# Patient Record
Sex: FEMALE | Race: WHITE | NOT HISPANIC OR LATINO | Employment: OTHER | URBAN - METROPOLITAN AREA
[De-identification: names, ages, dates, MRNs, and addresses within clinical notes are randomized per-mention and may not be internally consistent; named-entity substitution may affect disease eponyms.]

---

## 2017-01-24 ENCOUNTER — TELEPHONE (OUTPATIENT)
Dept: OPHTHALMOLOGY | Facility: CLINIC | Age: 52
End: 2017-01-24

## 2017-01-24 NOTE — TELEPHONE ENCOUNTER
----- Message from Kandi Dudley sent at 1/24/2017 12:39 PM CST -----  Contact: Self  Good afternoon,     Pt would like a call back regarding rescheduling appt on 01/27/17 at 1:15pm.    Pt can be reached at [phone]    Thank you!

## 2019-02-15 ENCOUNTER — TELEPHONE (OUTPATIENT)
Dept: PLASTIC SURGERY | Facility: CLINIC | Age: 54
End: 2019-02-15

## 2019-02-15 NOTE — TELEPHONE ENCOUNTER
I called this pt, I left her a detailed voicemail with our office number  . I will call her again later to see if I can get a response.         ----- Message from Naeem Spivey MD sent at 2/14/2019  5:09 PM CST -----  Bryce Abdalla-    Same thing- maybe she wants to come in sooner?    Seth

## 2019-02-15 NOTE — TELEPHONE ENCOUNTER
Called pt at Dr request to see if she could move her appt up earlier , Pt stated that she couldnt because she lives in [place] and the trip out here has already been planned ahead of time & she wouldnt be able to come any sooner.         ----- Message from Jane Cat sent at 2/15/2019  2:36 PM CST -----  Contact: Pt.Self   Patient Returning Call from [facility]    Who Left Message for Patient:   Keya     Communication Preference:  [phone]    Additional Information:    Thank You

## 2019-03-15 ENCOUNTER — TELEPHONE (OUTPATIENT)
Dept: PLASTIC SURGERY | Facility: CLINIC | Age: 54
End: 2019-03-15

## 2019-03-15 NOTE — TELEPHONE ENCOUNTER
Called pt to comfirm her appt on 3/18 at 1:00 pm. pT stated that she was just getting ready to call us to confirm appt so I called right on time. Pt stated that she knew where we are located & she would be here!

## 2019-03-18 ENCOUNTER — OFFICE VISIT (OUTPATIENT)
Dept: PLASTIC SURGERY | Facility: CLINIC | Age: 54
End: 2019-03-18
Payer: COMMERCIAL

## 2019-03-18 VITALS
HEART RATE: 89 BPM | BODY MASS INDEX: 27.54 KG/M2 | SYSTOLIC BLOOD PRESSURE: 149 MMHG | WEIGHT: 150.56 LBS | DIASTOLIC BLOOD PRESSURE: 92 MMHG

## 2019-03-18 DIAGNOSIS — T85.43XA BREAST IMPLANT RUPTURE, INITIAL ENCOUNTER: Primary | ICD-10-CM

## 2019-03-18 PROCEDURE — 99999 PR PBB SHADOW E&M-EST. PATIENT-LVL III: ICD-10-PCS | Mod: PBBFAC,,, | Performed by: SURGERY

## 2019-03-18 PROCEDURE — 99999 PR PBB SHADOW E&M-EST. PATIENT-LVL III: CPT | Mod: PBBFAC,,, | Performed by: SURGERY

## 2019-03-18 PROCEDURE — 3080F PR MOST RECENT DIASTOLIC BLOOD PRESSURE >= 90 MM HG: ICD-10-PCS | Mod: CPTII,S$GLB,, | Performed by: SURGERY

## 2019-03-18 PROCEDURE — 3008F BODY MASS INDEX DOCD: CPT | Mod: CPTII,S$GLB,, | Performed by: SURGERY

## 2019-03-18 PROCEDURE — 99204 OFFICE O/P NEW MOD 45 MIN: CPT | Mod: S$GLB,,, | Performed by: SURGERY

## 2019-03-18 PROCEDURE — 3077F SYST BP >= 140 MM HG: CPT | Mod: CPTII,S$GLB,, | Performed by: SURGERY

## 2019-03-18 PROCEDURE — 3080F DIAST BP >= 90 MM HG: CPT | Mod: CPTII,S$GLB,, | Performed by: SURGERY

## 2019-03-18 PROCEDURE — 3077F PR MOST RECENT SYSTOLIC BLOOD PRESSURE >= 140 MM HG: ICD-10-PCS | Mod: CPTII,S$GLB,, | Performed by: SURGERY

## 2019-03-18 PROCEDURE — 99204 PR OFFICE/OUTPT VISIT, NEW, LEVL IV, 45-59 MIN: ICD-10-PCS | Mod: S$GLB,,, | Performed by: SURGERY

## 2019-03-18 PROCEDURE — 3008F PR BODY MASS INDEX (BMI) DOCUMENTED: ICD-10-PCS | Mod: CPTII,S$GLB,, | Performed by: SURGERY

## 2019-03-18 RX ORDER — LABETALOL 200 MG/1
200 TABLET, FILM COATED ORAL 2 TIMES DAILY
COMMUNITY
End: 2021-01-14

## 2019-03-18 RX ORDER — ZOLPIDEM TARTRATE 10 MG/1
5 TABLET ORAL NIGHTLY PRN
COMMUNITY

## 2019-03-18 RX ORDER — PRAZOSIN HYDROCHLORIDE 1 MG/1
1 CAPSULE ORAL 2 TIMES DAILY
COMMUNITY
End: 2021-01-14

## 2019-03-18 RX ORDER — DRONABINOL 2.5 MG/1
2.5 CAPSULE ORAL
COMMUNITY

## 2019-03-19 NOTE — PROGRESS NOTES
"PLASTIC & RECONSTRUCTIVE CONSULTATION NOTE    CC  No chief complaint on file.  Wants to have bilateral breast implants removed    Referring Provider- Self  PCP: Dickson Bourne, DO    HPI  History from patient, chart, referring provider  Juany Bourne is a 53 y.o. female presenting with pain in bilateral breast after breast reconstruction.  She had bilateral nipple sparing mastectomy for "fibrocystic disease" in the past.  It is unclear whether she had cancer diagnosis.  She reports that she has had extreme sensitivity in bilateral nipple areola complexes after surgery.  She reports that she has had at least 6 different surgeries to reconstruct her breasts.  She reports that her last surgery was 1 year ago in the [place] area.  That surgeon has since .  She says that she has not seen any other providers in their area.  She explains that she had so many surgeries because she had tight scar capsules form around her saline implants.  She also had rupture of her saline implants.  She has this is the first surgery where she had silicone gel implants placed.  She has a primary care doctor in her area that is not with the [facility] system.  She has not required any kind of surveillance imaging of her breasts since having mastectomies.  She denies fevers, chills, weight loss or recent weight change.      She is also requesting to have her nipple areola complexes removed at the time of explantation because of sensitivity.      She is smoking every day but is willing to quit before surgery    [facility]  Patient Active Problem List    Diagnosis Date Noted    Post-operative state 2016    Nuclear sclerotic cataract of right eye 2016    Nuclear sclerosis 2016    Floater, vitreous 2015       [facility]  Past Surgical History:   Procedure Laterality Date    ANGIOPLASTY      cerebral    BREAST SURGERY      implants bilateral    CATARACT EXTRACTION W/  INTRAOCULAR LENS IMPLANT Left 16    Dr Omer     " HYSTERECTOMY      INSERTION-INTRAOCULAR LENS (IOL) Right 3/7/2016    [facility]    INSERTION-INTRAOCULAR LENS (IOL) Left 1/4/2016    [facility]    MASTECTOMY      bilateral    PHACOEMULSIFICATION-ASPIRATION-CATARACT Right 3/7/2016    [facility]    PHACOEMULSIFICATION-ASPIRATION-CATARACT Left 1/4/2016    [facility]    THYROIDECTOMY  2003   Brain surgery    FH  Family History   Adopted: Yes   Problem Relation Age of Onset    Hypertension Mother     Diabetes Mother     Hypertension Father     Diabetes Father     Amblyopia Neg Hx     Blindness Neg Hx     Cancer Neg Hx     Cataracts Neg Hx     Glaucoma Neg Hx     Macular degeneration Neg Hx     Retinal detachment Neg Hx     Strabismus Neg Hx     Stroke Neg Hx     Thyroid disease Neg Hx        MEDICATIONS  Outpatient Medications Marked as Taking for the 3/18/19 encounter (Office Visit) with Naeem Spivey MD   Medication Sig Dispense Refill    alprazolam (XANAX) 2 MG Tab Take 2 mg by mouth daily as needed (once daily or 1/2 tab daily).      aspirin (ECOTRIN) 81 MG EC tablet Take 81 mg by mouth once daily.      cloNIDine 0.1 mg/24 hr td ptwk (CATAPRES) 0.1 mg/24 hr Place 1 patch onto the skin every 7 days.      dronabinol (MARINOL) 2.5 MG capsule Take 2.5 mg by mouth 2 (two) times daily before meals.      insulin lispro (HUMALOG KWIKPEN) 100 unit/mL InPn pen Inject into the skin.      labetalol (NORMODYNE) 200 MG tablet Take 200 mg by mouth 2 (two) times daily.      levothyroxine (SYNTHROID) 25 MCG tablet Take 25 mcg by mouth once daily.      oxycodone (OXY-IR) 5 mg Cap Take 10 mg by mouth every 4 (four) hours as needed.      prazosin (MINIPRESS) 1 MG Cap Take 1 mg by mouth 2 (two) times daily.      PROMETHAZINE HCL (PROMETHAZINE ORAL) Take 50 mg by mouth 4 (four) times daily.      ranitidine (ZANTAC) 75 MG tablet  "Take 75 mg by mouth 2 (two) times daily.      zolpidem (AMBIEN) 10 mg Tab Take 5 mg by mouth nightly as needed.         ALLERGIES   Review of patient's allergies indicates:   Allergen Reactions    Flurox [fluorescein-benoxinate] Hives     Rash, Itching    Morphine Other (See Comments)     Mental disturbances, delusional    Tetracyclines Nausea And Vomiting    Zofran [ondansetron hcl (pf)] Other (See Comments)     Muscle spasms       SOCIAL HISTORY  Tobacco:   Social History     Tobacco Use   Smoking Status Current Some Day Smoker     EtOH:   Social History     Substance and Sexual Activity   Alcohol Use No       ROS  Pertinent ROS otherwise negative except per HPI and intake form    PHYSICAL EXAM  BP (!) 149/92   Pulse 89   Wt 68.3 kg (150 lb 9.2 oz)   BMI 27.54 kg/m²     Vitals:    03/18/19 1232   BP: (!) 149/92   Pulse: 89   Weight: 68.3 kg (150 lb 9.2 oz)       Height:   Ht Readings from Last 1 Encounters:   03/04/16 5' 2" (1.575 m)       Weight:   Wt Readings from Last 1 Encounters:   03/18/19 68.3 kg (150 lb 9.2 oz)       Body mass index is 27.54 kg/m².    Gen: Physical examination reveals a well developed, well nourished female of good mood who is alert and is oriented to person, place, time.    HEENT: Head is normocephalic and atraumatic. Extraocular motion is intact. Mucosal membranes moist.    Pulm: Breathing is non-labored, normal respiratory effort    CV: Palpable peripheral pulses    Extremities: No cyanosis or edema    Musculoskeletal: Normal gate and stance    Skin: Normal turgor    GI: Abdomen Soft, Non-tender, Non-distended. Moderate lipodystrophy.    Scars: Hypertrophic inframammary scars bilaterally.  She has roger-areolar scars bilaterally.  No axillary adenopathy.  No breast masses.  She has bilateral grade I capsules.  Her implants are at most 300 cc bilaterally.  They are small for her pockets.  She has general abdominal fat.  No hernias or masses in her abdomen.  She has dysesthesias " provoked in bilateral NAC's.  Pinch testing revealed that there is adequate laxity of tissues in the vicinity of her nacs were at least circumareolar incision.      LABS  Lab Results   Component Value Date    WBC 9.11 04/11/2006    HGB 13.6 04/11/2006    HCT 40.3 04/11/2006    MCV 92.2 04/11/2006     04/11/2006     Lab Results   Component Value Date     04/11/2006    K 3.8 04/11/2006     04/11/2006    CO2 27 04/11/2006     Lab Results   Component Value Date    ALBUMIN 5.1 02/16/2009     Lab Results   Component Value Date    CREATININE 0.8 04/11/2006     No results found for: LABA1C, HGBA1C  [unfilled]    ASSESSMENT  1.  Bilateral breast implant pain  2.  Possible history of breast cancer  3.  Cannot rule out breast implant rupture  4.  History of fibrocystic disease of breasts  5.  Travelled from out of state for surgical evaluation  6.  Actively smoking.      ?  INFORMED CONSENT FOR SURGERY  Risks, benefits, outcomes, possible complications, perioperative restrictions, recovery, and potential disability were reviewed. Permission to obtain photographs before, during, and after surgery was also granted. Questions were answered. Written preoperative instructions and potential complications were given.    PLAN    Wants bilateral breast implants removed.  Will submit for insurance authorization.     Will obtain medical photography pre-operatively  MRI ordered to rule out implant rupture.  I advised her that regardless of fee schedule for implant replacement she should proceed to evaluate whether her implants are ruptured.  She will stop smoking before surgery.  This will be a requirement for me before surgery.  I will perform nicotine testing pre-operatively.       I explained that I cannot guarantee removing the implants will get rid of her breast pain.  She accepts that she will be flat chested.  She also understands that wants to excise her nipple areolar complexes.  I would send them for  pathology.    Other risks of surgery include bleeding, infection, hematoma, seroma, chest contour abnormality, poor chest aesthetics.  I pointed out that I am concerned with making circumareolar type incisions in the setting of multiple previous mastectomy skin incisions for implant placement/revisions.  I can SPY the tissues at the time of surgery to determine perfusion.       Other risks of surgery include the possibility of gel implant extrusion into the breast parenchyma at the time of removal.  I explained that I would remove all gross implant material at the time of the surgery.  I also explained that I attempt to remove all capsule material that I can see.  I explained that some capsule could be left in her breast pocket if I deem that it is unsafe to remove it.  I explained that the capsule is often adherent to chest well and there is risk of bleeding of other complications such as pneumothorax post procedure.  I also explained that it can be adherent to the pectoralis muscle and I may decide not to remove all of the material at the time of surgery.  Other risks of the surgery include traction on the breast skin and nipple/areola, and the possibility of skin damage post procedure.  Further risks of surgery include hematoma, seroma.  She may require additional procedures post operatively and may experience asymmetries or cosmetic deformities post procedure.  She will require drains post procedure for at least 1 week.  She will need to make regular follow up appointments with me in the office.       Will need PCP, Cardiology, and PAT triage clearance before proceeding with surgery.  If she has not had labs in the last year, would need CBC and CMP and nicotine testing.       45 minutes of face to face time, of which greater than fifty percent of the total visit was  counseling/coordinating care        Plastic & Reconstructive Surgery  Microsurgery  [facility]  c/o Naeem Spivey  M.D.  [facility]  [address]  [address] [place] [address]     Work [phone]  Toll free [phone]  If no answer [phone]

## 2019-04-12 ENCOUNTER — TELEPHONE (OUTPATIENT)
Dept: PLASTIC SURGERY | Facility: CLINIC | Age: 54
End: 2019-04-12

## 2019-04-12 NOTE — PROGRESS NOTES
spoke with pt and I had MRI moved to 05/06/19 , Pt stated that after speaking w/ insurance company, they informed her that MRI would be covered after 05/01/19. Pt verbalized understanding.Stated implants were making her sick & was ready to have them out.

## 2019-04-15 ENCOUNTER — TELEPHONE (OUTPATIENT)
Dept: PLASTIC SURGERY | Facility: CLINIC | Age: 54
End: 2019-04-15

## 2019-04-15 NOTE — TELEPHONE ENCOUNTER
"Spoke with [facility] and was given the following info:  Fax letter of appeal to [phone]  - letter needs to state why mammo and US cannot be ordered prior to MRI   - needs to be sent in the next 2 weeks  - letter also needs to have case number on cover sheet and "request for reconsideration"      Peer to peer line: [phone] to set up time for phone call  - [employer]    "

## 2019-04-18 DIAGNOSIS — Z98.82 HISTORY OF BREAST IMPLANT: Primary | ICD-10-CM

## 2019-04-18 DIAGNOSIS — N64.4 PAIN IN BREAST: Primary | ICD-10-CM

## 2019-04-22 ENCOUNTER — TELEPHONE (OUTPATIENT)
Dept: PLASTIC SURGERY | Facility: CLINIC | Age: 54
End: 2019-04-22

## 2019-04-22 NOTE — TELEPHONE ENCOUNTER
called pt to get her scheduled for breast US , pt stated that she had already called and gotten scheduled for one on 4/26. She wanted to know if visit was improved by insurance comoany, I told pt I was unaware but I gave her # to billing dept she said she would call. Pt verbalized understanding.                ----- Message from Shannan Rainey PA-C sent at 4/18/2019  2:25 PM CDT -----  Breast US order placed. Please call patient and help set up apt

## 2019-05-03 ENCOUNTER — TELEPHONE (OUTPATIENT)
Dept: PLASTIC SURGERY | Facility: CLINIC | Age: 54
End: 2019-05-03

## 2019-05-03 NOTE — TELEPHONE ENCOUNTER
Spoke with pt and explained to her why MRI wasnt covered by insurance, She said that was ok as long as she could still come for the US. I told pt US was still scheduled for 5/13 but not MRI. Pt verbalized understanding.           ----- Message from Shannan Rainey PA-C sent at 5/3/2019  1:43 PM CDT -----  Contact: Pt  Simone Landaverde, are you linked to my staff inbox? If not please ask jackie to add it.      Please inform patient the reason why her insurance company would not approve MRI is due to the fact an ultrasound or mammo had not been done prior.    They will reconsider approving MRI if other info does not show conclusive info. Her insurance company will be the ones to provide her a quote on cost. Please assist her if she needs any help.    ----- Message -----  From: Uche Chanel  Sent: 5/3/2019  12:57 PM  To: Brigid Campbell Staff    Needs Advice    Reason for call:The Pt knows that she has been approved by her insurance for the US Mammo on 5/13/19.  She stated that she thought that her insurance wasn't going to pay for the MRI which still shows as an appt to follow her US.  She would like a call back from your office since you are the ordering provider.        Communication Preference:[phone]    Additional Information:She really like a call back today since it's hard to communicate with the insurance company.

## 2019-05-13 ENCOUNTER — HOSPITAL ENCOUNTER (OUTPATIENT)
Dept: RADIOLOGY | Facility: HOSPITAL | Age: 54
Discharge: HOME | End: 2019-05-13
Attending: PHYSICIAN ASSISTANT
Payer: COMMERCIAL

## 2019-05-13 VITALS — HEIGHT: 62 IN | WEIGHT: 150 LBS | BODY MASS INDEX: 27.6 KG/M2

## 2019-05-13 DIAGNOSIS — Z98.82 HISTORY OF BREAST IMPLANT: ICD-10-CM

## 2019-05-13 PROCEDURE — 76641 ULTRASOUND BREAST COMPLETE: CPT | Mod: 26,RT,, | Performed by: RADIOLOGY

## 2019-05-13 PROCEDURE — 76641 ULTRASOUND BREAST COMPLETE: CPT | Mod: TC,50,PO

## 2019-05-13 PROCEDURE — 76641 ULTRASOUND BREAST COMPLETE: CPT | Mod: 26,LT,, | Performed by: RADIOLOGY

## 2019-05-13 PROCEDURE — 76641 PR US BREAST COMPLETE UNILAT: ICD-10-PCS | Mod: 26,LT,, | Performed by: RADIOLOGY

## 2019-05-17 ENCOUNTER — TELEPHONE (OUTPATIENT)
Dept: PLASTIC SURGERY | Facility: CLINIC | Age: 54
End: 2019-05-17

## 2019-05-17 NOTE — TELEPHONE ENCOUNTER
Called and spoke with pt and let her know that her US results were negative & there were no findings of extra implant fluid collection  ,, no evidence of any suspicious mass & I would be in touch with Dr Spivey regarding a possible plan of action for pt. Pt was hysterical telling me she is in constant pain because she believed implants were leaking. I explained to the pt that I wasn't saying she way lying I was simply calling to give her the results of US . Pt verbalized understanding.

## 2019-05-20 ENCOUNTER — TELEPHONE (OUTPATIENT)
Dept: PLASTIC SURGERY | Facility: CLINIC | Age: 54
End: 2019-05-20

## 2019-05-20 PROBLEM — T85.848A PAIN FROM BREAST IMPLANT: Status: ACTIVE | Noted: 2019-05-20

## 2019-05-20 NOTE — TELEPHONE ENCOUNTER
Spoke with patient reviewed US results.    Patient would like to proceed with case submission without waiting on if MRI would be approved. She understands an appeal was sent and a peer to peer was made on her behalf for the MRI, which was still denied.    Case request sent today. Will keep patient updated. She understands this might not be covered by insurance company.

## 2019-05-31 ENCOUNTER — TELEPHONE (OUTPATIENT)
Dept: PLASTIC SURGERY | Facility: CLINIC | Age: 54
End: 2019-05-31

## 2019-05-31 NOTE — TELEPHONE ENCOUNTER
Spoke with pt regarding her insurance . Pt wanted to know if an appeal was sent. Pt stated that she is in constant pain and that she is frustrated and would like to talk to someone in insurance. I told her I would call her bk with an update. Pt verbalized understanding,               ----- Message from Analilia Shetty sent at 5/31/2019  8:46 AM CDT -----  Contact: pt  Type:  Needs Medical Advice    Who Called:  Pt  Symptoms (please be specific):  How long has patient had these symptoms:   Pharmacy name and phone #:    Would the patient rather a call back or a response via [facility]?   Best Call Back Number:[phone]  Additional Information:  Pt called would like a call back trying to schedule surgery with insurance

## 2021-01-14 ENCOUNTER — HOSPITAL ENCOUNTER (OUTPATIENT)
Dept: RADIOLOGY | Facility: HOSPITAL | Age: 56
Discharge: HOME | End: 2021-01-14
Attending: STUDENT IN AN ORGANIZED HEALTH CARE EDUCATION/TRAINING PROGRAM
Payer: COMMERCIAL

## 2021-01-14 ENCOUNTER — OFFICE VISIT (OUTPATIENT)
Dept: RHEUMATOLOGY | Facility: CLINIC | Age: 56
End: 2021-01-14
Payer: COMMERCIAL

## 2021-01-14 VITALS
DIASTOLIC BLOOD PRESSURE: 89 MMHG | WEIGHT: 151 LBS | BODY MASS INDEX: 28.51 KG/M2 | HEIGHT: 61 IN | SYSTOLIC BLOOD PRESSURE: 135 MMHG | HEART RATE: 91 BPM

## 2021-01-14 DIAGNOSIS — M19.90 OSTEOARTHRITIS, UNSPECIFIED OSTEOARTHRITIS TYPE, UNSPECIFIED SITE: ICD-10-CM

## 2021-01-14 DIAGNOSIS — R06.02 SOB (SHORTNESS OF BREATH) ON EXERTION: ICD-10-CM

## 2021-01-14 DIAGNOSIS — M25.559 HIP PAIN: ICD-10-CM

## 2021-01-14 DIAGNOSIS — R19.00 ABDOMINAL SWELLING: ICD-10-CM

## 2021-01-14 DIAGNOSIS — M79.7 FIBROMYALGIA: Primary | ICD-10-CM

## 2021-01-14 DIAGNOSIS — G47.00 INSOMNIA, UNSPECIFIED TYPE: ICD-10-CM

## 2021-01-14 PROCEDURE — 3008F BODY MASS INDEX DOCD: CPT | Mod: CPTII,S$GLB,, | Performed by: INTERNAL MEDICINE

## 2021-01-14 PROCEDURE — 1125F PR PAIN SEVERITY QUANTIFIED, PAIN PRESENT: ICD-10-PCS | Mod: S$GLB,,, | Performed by: INTERNAL MEDICINE

## 2021-01-14 PROCEDURE — 3008F PR BODY MASS INDEX (BMI) DOCUMENTED: ICD-10-PCS | Mod: CPTII,S$GLB,, | Performed by: INTERNAL MEDICINE

## 2021-01-14 PROCEDURE — 73521 X-RAY EXAM HIPS BI 2 VIEWS: CPT | Mod: 26,,, | Performed by: RADIOLOGY

## 2021-01-14 PROCEDURE — 71046 X-RAY EXAM CHEST 2 VIEWS: CPT | Mod: TC

## 2021-01-14 PROCEDURE — 3075F SYST BP GE 130 - 139MM HG: CPT | Mod: CPTII,S$GLB,, | Performed by: INTERNAL MEDICINE

## 2021-01-14 PROCEDURE — 3079F DIAST BP 80-89 MM HG: CPT | Mod: CPTII,S$GLB,, | Performed by: INTERNAL MEDICINE

## 2021-01-14 PROCEDURE — 3079F PR MOST RECENT DIASTOLIC BLOOD PRESSURE 80-89 MM HG: ICD-10-PCS | Mod: CPTII,S$GLB,, | Performed by: INTERNAL MEDICINE

## 2021-01-14 PROCEDURE — 71046 XR CHEST PA AND LATERAL: ICD-10-PCS | Mod: 26,,, | Performed by: RADIOLOGY

## 2021-01-14 PROCEDURE — 99999 PR PBB SHADOW E&M-EST. PATIENT-LVL V: CPT | Mod: PBBFAC,,, | Performed by: INTERNAL MEDICINE

## 2021-01-14 PROCEDURE — 99205 PR OFFICE/OUTPT VISIT, NEW, LEVL V, 60-74 MIN: ICD-10-PCS | Mod: S$GLB,,, | Performed by: INTERNAL MEDICINE

## 2021-01-14 PROCEDURE — 73521 X-RAY EXAM HIPS BI 2 VIEWS: CPT | Mod: TC

## 2021-01-14 PROCEDURE — 71046 X-RAY EXAM CHEST 2 VIEWS: CPT | Mod: 26,,, | Performed by: RADIOLOGY

## 2021-01-14 PROCEDURE — 3075F PR MOST RECENT SYSTOLIC BLOOD PRESS GE 130-139MM HG: ICD-10-PCS | Mod: CPTII,S$GLB,, | Performed by: INTERNAL MEDICINE

## 2021-01-14 PROCEDURE — 1125F AMNT PAIN NOTED PAIN PRSNT: CPT | Mod: S$GLB,,, | Performed by: INTERNAL MEDICINE

## 2021-01-14 PROCEDURE — 99999 PR PBB SHADOW E&M-EST. PATIENT-LVL V: ICD-10-PCS | Mod: PBBFAC,,, | Performed by: INTERNAL MEDICINE

## 2021-01-14 PROCEDURE — 99205 OFFICE O/P NEW HI 60 MIN: CPT | Mod: S$GLB,,, | Performed by: INTERNAL MEDICINE

## 2021-01-14 PROCEDURE — 73521 XR HIPS BILATERAL 2 VIEW INCL AP PELVIS: ICD-10-PCS | Mod: 26,,, | Performed by: RADIOLOGY

## 2021-01-14 RX ORDER — CLONIDINE HYDROCHLORIDE 0.1 MG/1
0.1 TABLET ORAL 2 TIMES DAILY
COMMUNITY

## 2021-01-14 RX ORDER — ATORVASTATIN CALCIUM 40 MG/1
40 TABLET, FILM COATED ORAL DAILY
COMMUNITY

## 2021-01-14 RX ORDER — THYROID 60 MG/1
90 TABLET ORAL
COMMUNITY

## 2021-01-14 RX ORDER — DULOXETIN HYDROCHLORIDE 30 MG/1
30 CAPSULE, DELAYED RELEASE ORAL DAILY
Qty: 30 CAPSULE | Refills: 11 | Status: SHIPPED | OUTPATIENT
Start: 2021-01-14 | End: 2022-01-14

## 2021-01-14 RX ORDER — PANTOPRAZOLE SODIUM 40 MG/1
40 TABLET, DELAYED RELEASE ORAL DAILY
COMMUNITY

## 2021-01-14 RX ORDER — LOSARTAN POTASSIUM 100 MG/1
100 TABLET ORAL DAILY
COMMUNITY

## 2021-01-14 ASSESSMENT — ROUTINE ASSESSMENT OF PATIENT INDEX DATA (RAPID3)
PATIENT GLOBAL ASSESSMENT SCORE: 5.5
PSYCHOLOGICAL DISTRESS SCORE: 7.7
FATIGUE SCORE: 7
AM STIFFNESS SCORE: 1, YES
MDHAQ FUNCTION SCORE: 0.5
WHEN YOU AWAKENED IN THE MORNING OVER THE LAST WEEK, PLEASE INDICATE THE AMOUNT OF TIME IT TAKES UNTIL YOU ARE AS LIMBER AS YOU WILL BE FOR THE DAY: 30
TOTAL RAPID3 SCORE: 4.39
PAIN SCORE: 6

## 2022-04-19 ENCOUNTER — TELEPHONE (OUTPATIENT)
Dept: CARDIOLOGY | Facility: CLINIC | Age: 57
End: 2022-04-19
Payer: COMMERCIAL

## 2022-04-19 NOTE — TELEPHONE ENCOUNTER
Returned patients call to set up an appointment with Dr Jung for preop clearance.  Called and left her a message since Dr Jung has not seen her in 20 years.

## 2022-06-20 ENCOUNTER — TELEPHONE (OUTPATIENT)
Dept: CARDIOLOGY | Facility: CLINIC | Age: 57
End: 2022-06-20
Payer: COMMERCIAL

## 2022-06-20 NOTE — TELEPHONE ENCOUNTER
Pt was scheduled for today @ 1100 pt was a no show, called pt to reschedule no answer left message with my direct line to call back.

## 2023-01-31 ENCOUNTER — TELEPHONE (OUTPATIENT)
Dept: CARDIOLOGY | Facility: CLINIC | Age: 58
End: 2023-01-31
Payer: COMMERCIAL

## 2023-01-31 NOTE — TELEPHONE ENCOUNTER
Patient called to reschedule her appointment with Dr Jung.  She was told she has an enlarged heart by an EKG.  They will get a copy of it and fax to Dr Jung.  When we receive it I told them we would discuss with Dr Jung and set up any other testing he may want on the same day.

## 2023-01-31 NOTE — TELEPHONE ENCOUNTER
Returned call .  Left vm with office number      ----- Message from Kamilah Gordon sent at 1/31/2023  2:52 PM CST -----  Regarding: Appt  Contact: [phone]  Patient is calling to reschedule missed appt due to COVID. Please contact pt

## 2023-01-31 NOTE — TELEPHONE ENCOUNTER
----- Message from Kamilah Gordon sent at 1/31/2023  2:52 PM CST -----  Regarding: Appt  Contact: [phone]  Patient is calling to reschedule missed appt due to COVID. Please contact pt

## 2023-02-02 DIAGNOSIS — I25.10 CORONARY ARTERY DISEASE INVOLVING NATIVE HEART, UNSPECIFIED VESSEL OR LESION TYPE, UNSPECIFIED WHETHER ANGINA PRESENT: Primary | ICD-10-CM

## 2023-03-13 ENCOUNTER — HOSPITAL ENCOUNTER (OUTPATIENT)
Dept: CARDIOLOGY | Facility: CLINIC | Age: 58
Discharge: HOME | End: 2023-03-13
Payer: COMMERCIAL

## 2023-03-13 ENCOUNTER — HOSPITAL ENCOUNTER (OUTPATIENT)
Dept: CARDIOLOGY | Facility: HOSPITAL | Age: 58
Discharge: HOME | End: 2023-03-13
Attending: INTERNAL MEDICINE
Payer: COMMERCIAL

## 2023-03-13 ENCOUNTER — OFFICE VISIT (OUTPATIENT)
Dept: CARDIOLOGY | Facility: CLINIC | Age: 58
End: 2023-03-13
Payer: COMMERCIAL

## 2023-03-13 VITALS
HEART RATE: 86 BPM | WEIGHT: 158.94 LBS | OXYGEN SATURATION: 96 % | HEIGHT: 61 IN | DIASTOLIC BLOOD PRESSURE: 82 MMHG | SYSTOLIC BLOOD PRESSURE: 127 MMHG | BODY MASS INDEX: 30.01 KG/M2

## 2023-03-13 VITALS
DIASTOLIC BLOOD PRESSURE: 80 MMHG | HEART RATE: 70 BPM | SYSTOLIC BLOOD PRESSURE: 120 MMHG | BODY MASS INDEX: 28.51 KG/M2 | WEIGHT: 151 LBS | HEIGHT: 61 IN

## 2023-03-13 DIAGNOSIS — I25.10 CORONARY ARTERY DISEASE INVOLVING NATIVE HEART, UNSPECIFIED VESSEL OR LESION TYPE, UNSPECIFIED WHETHER ANGINA PRESENT: ICD-10-CM

## 2023-03-13 DIAGNOSIS — K74.60 NON-ALCOHOLIC CIRRHOSIS: ICD-10-CM

## 2023-03-13 DIAGNOSIS — R07.9 CHEST PAIN, UNSPECIFIED TYPE: Primary | ICD-10-CM

## 2023-03-13 DIAGNOSIS — I10 PRIMARY HYPERTENSION: ICD-10-CM

## 2023-03-13 DIAGNOSIS — R07.9 CHEST PAIN ON EXERTION: ICD-10-CM

## 2023-03-13 LAB
ASCENDING AORTA: 2.92 CM
AV INDEX (PROSTH): 0.84
AV MEAN GRADIENT: 2 MMHG
AV PEAK GRADIENT: 3 MMHG
AV VALVE AREA: 2.09 CM2
AV VELOCITY RATIO: 0.88
BSA FOR ECHO PROCEDURE: 1.72 M2
CV ECHO LV RWT: 0.25 CM
DOP CALC AO PEAK VEL: 0.8 M/S
DOP CALC AO VTI: 17.1 CM
DOP CALC LVOT AREA: 2.5 CM2
DOP CALC LVOT DIAMETER: 1.78 CM
DOP CALC LVOT PEAK VEL: 0.7 M/S
DOP CALC LVOT STROKE VOLUME: 35.82 CM3
DOP CALCLVOT PEAK VEL VTI: 14.4 CM
E WAVE DECELERATION TIME: 328.16 MSEC
E/A RATIO: 0.89
E/E' RATIO: 7.64 M/S
ECHO LV POSTERIOR WALL: 0.64 CM (ref 0.6–1.1)
EJECTION FRACTION: 65 %
FRACTIONAL SHORTENING: 25 % (ref 28–44)
INTERVENTRICULAR SEPTUM: 0.52 CM (ref 0.6–1.1)
LA MAJOR: 3.65 CM
LA MINOR: 3.47 CM
LA WIDTH: 2.94 CM
LEFT ATRIUM SIZE: 3 CM
LEFT ATRIUM VOLUME INDEX MOD: 11.1 ML/M2
LEFT ATRIUM VOLUME INDEX: 15.9 ML/M2
LEFT ATRIUM VOLUME MOD: 18.59 CM3
LEFT ATRIUM VOLUME: 26.67 CM3
LEFT INTERNAL DIMENSION IN SYSTOLE: 3.87 CM (ref 2.1–4)
LEFT VENTRICLE DIASTOLIC VOLUME INDEX: 75.35 ML/M2
LEFT VENTRICLE DIASTOLIC VOLUME: 126.58 ML
LEFT VENTRICLE MASS INDEX: 57 G/M2
LEFT VENTRICLE SYSTOLIC VOLUME INDEX: 38.6 ML/M2
LEFT VENTRICLE SYSTOLIC VOLUME: 64.8 ML
LEFT VENTRICULAR INTERNAL DIMENSION IN DIASTOLE: 5.15 CM (ref 3.5–6)
LEFT VENTRICULAR MASS: 95.99 G
LV LATERAL E/E' RATIO: 7 M/S
LV SEPTAL E/E' RATIO: 8.4 M/S
MV PEAK A VEL: 0.47 M/S
MV PEAK E VEL: 0.42 M/S
MV STENOSIS PRESSURE HALF TIME: 95.17 MS
MV VALVE AREA P 1/2 METHOD: 2.31 CM2
PISA TR MAX VEL: 2.37 M/S
RA MAJOR: 3.79 CM
RA PRESSURE: 3 MMHG
RA WIDTH: 2.53 CM
RIGHT VENTRICULAR END-DIASTOLIC DIMENSION: 3.09 CM
RV TISSUE DOPPLER FREE WALL SYSTOLIC VELOCITY 1 (APICAL 4 CHAMBER VIEW): 7.41 CM/S
SINUS: 2.49 CM
STJ: 2.45 CM
TDI LATERAL: 0.06 M/S
TDI SEPTAL: 0.05 M/S
TDI: 0.06 M/S
TR MAX PG: 22 MMHG
TRICUSPID ANNULAR PLANE SYSTOLIC EXCURSION: 1.1 CM
TV REST PULMONARY ARTERY PRESSURE: 25 MMHG

## 2023-03-13 PROCEDURE — 3008F BODY MASS INDEX DOCD: CPT | Mod: CPTII,S$GLB,, | Performed by: INTERNAL MEDICINE

## 2023-03-13 PROCEDURE — 93306 TTE W/DOPPLER COMPLETE: CPT | Mod: 26,,, | Performed by: INTERNAL MEDICINE

## 2023-03-13 PROCEDURE — 3074F PR MOST RECENT SYSTOLIC BLOOD PRESSURE < 130 MM HG: ICD-10-PCS | Mod: CPTII,S$GLB,, | Performed by: INTERNAL MEDICINE

## 2023-03-13 PROCEDURE — 99999 PR PBB SHADOW E&M-EST. PATIENT-LVL IV: ICD-10-PCS | Mod: PBBFAC,,, | Performed by: INTERNAL MEDICINE

## 2023-03-13 PROCEDURE — 93005 ELECTROCARDIOGRAM TRACING: CPT | Mod: S$GLB,,, | Performed by: INTERNAL MEDICINE

## 2023-03-13 PROCEDURE — 3008F PR BODY MASS INDEX (BMI) DOCUMENTED: ICD-10-PCS | Mod: CPTII,S$GLB,, | Performed by: INTERNAL MEDICINE

## 2023-03-13 PROCEDURE — 3074F SYST BP LT 130 MM HG: CPT | Mod: CPTII,S$GLB,, | Performed by: INTERNAL MEDICINE

## 2023-03-13 PROCEDURE — 99999 PR PBB SHADOW E&M-EST. PATIENT-LVL IV: CPT | Mod: PBBFAC,,, | Performed by: INTERNAL MEDICINE

## 2023-03-13 PROCEDURE — 3079F PR MOST RECENT DIASTOLIC BLOOD PRESSURE 80-89 MM HG: ICD-10-PCS | Mod: CPTII,S$GLB,, | Performed by: INTERNAL MEDICINE

## 2023-03-13 PROCEDURE — 93306 ECHO (CUPID ONLY): ICD-10-PCS | Mod: 26,,, | Performed by: INTERNAL MEDICINE

## 2023-03-13 PROCEDURE — 99204 PR OFFICE/OUTPT VISIT, NEW, LEVL IV, 45-59 MIN: ICD-10-PCS | Mod: S$GLB,,, | Performed by: INTERNAL MEDICINE

## 2023-03-13 PROCEDURE — 1159F PR MEDICATION LIST DOCUMENTED IN MEDICAL RECORD: ICD-10-PCS | Mod: CPTII,S$GLB,, | Performed by: INTERNAL MEDICINE

## 2023-03-13 PROCEDURE — 93010 ELECTROCARDIOGRAM REPORT: CPT | Mod: S$GLB,,, | Performed by: INTERNAL MEDICINE

## 2023-03-13 PROCEDURE — 93005 EKG 12-LEAD: ICD-10-PCS | Mod: S$GLB,,, | Performed by: INTERNAL MEDICINE

## 2023-03-13 PROCEDURE — 99204 OFFICE O/P NEW MOD 45 MIN: CPT | Mod: S$GLB,,, | Performed by: INTERNAL MEDICINE

## 2023-03-13 PROCEDURE — 93306 TTE W/DOPPLER COMPLETE: CPT

## 2023-03-13 PROCEDURE — 3079F DIAST BP 80-89 MM HG: CPT | Mod: CPTII,S$GLB,, | Performed by: INTERNAL MEDICINE

## 2023-03-13 PROCEDURE — 93010 EKG 12-LEAD: ICD-10-PCS | Mod: S$GLB,,, | Performed by: INTERNAL MEDICINE

## 2023-03-13 PROCEDURE — 1159F MED LIST DOCD IN RCRD: CPT | Mod: CPTII,S$GLB,, | Performed by: INTERNAL MEDICINE

## 2023-03-13 RX ORDER — POTASSIUM CHLORIDE 750 MG/1
10 CAPSULE, EXTENDED RELEASE ORAL DAILY
COMMUNITY

## 2023-03-13 RX ORDER — ALPRAZOLAM 2 MG/1
2 TABLET ORAL NIGHTLY PRN
COMMUNITY

## 2023-03-13 RX ORDER — LEVOTHYROXINE SODIUM 75 UG/1
75 TABLET ORAL
COMMUNITY

## 2023-03-13 NOTE — ASSESSMENT & PLAN NOTE
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

## 2023-03-13 NOTE — PROGRESS NOTES
Interventional Cardiology Clinic Note    Subjective:   Patient ID:  Juany Bourne is a 57 y.o. female with diabetes, hypertension, left MCA PTA with Dr. Jung in 2001, thyroid nodules s/p thyroidectomy, non alcoholic cirrhosis who presents for abnormal ECG.     HPI  Patient comes in today because her ECG was read as having left atrial enlargement. She has intermittent chest pain at rest and with exertion, not reproducible. She has intermittent shortness of breath at rest and with exertion. She can walk 1 block before getting short of breath. Laying down helps resolve her symptoms. These have been ongoing for 1 year. Denies LE edema. She does not sleep flat due to acid reflux. She has frequent PND. Denies syncope. Denies issues with bleeding.     Review of Systems   Constitutional: Negative for chills and fever.   HENT:  Negative for nosebleeds.    Eyes:  Negative for redness.   Cardiovascular:  Positive for chest pain, dyspnea on exertion and paroxysmal nocturnal dyspnea. Negative for claudication, leg swelling, near-syncope, palpitations and syncope.   Respiratory:  Positive for shortness of breath. Negative for cough and sputum production.    Hematologic/Lymphatic: Negative for bleeding problem.   Musculoskeletal:  Negative for joint swelling and muscle weakness.   Gastrointestinal:  Negative for hematemesis, melena, nausea and vomiting.   Genitourinary:  Negative for hematuria.   Neurological:  Negative for dizziness and weakness.        History:       Past Medical History:   Diagnosis Date    Anxiety     Arthritis     Cancer     questionable breast cancer 1991    Cataract     Diabetes mellitus     insulin prn    Disc disease, degenerative, cervical     Encounter for blood transfusion     Hypertension     Stroke 2001    Tachycardia     Thyroid disease     thyroidectomy due to nodules     Past Surgical History:   Procedure Laterality Date    ANGIOPLASTY      cerebral    AUGMENTATION OF BREAST Bilateral     5  implant sx    BREAST SURGERY      implants bilateral    CATARACT EXTRACTION W/  INTRAOCULAR LENS IMPLANT Left 01/04/16    Dr Omer     HYSTERECTOMY      MASTECTOMY Bilateral     28 years ago    THYROIDECTOMY  2003     Social History     Socioeconomic History    Marital status:    Tobacco Use    Smoking status: Some Days   Substance and Sexual Activity    Alcohol use: No    Drug use: No     Family History   Adopted: Yes   Problem Relation Age of Onset    Hypertension Mother     Diabetes Mother     Hypertension Father     Diabetes Father     No Known Problems Sister     No Known Problems Brother     No Known Problems Brother     No Known Problems Maternal Aunt     No Known Problems Maternal Uncle     No Known Problems Paternal Aunt     No Known Problems Paternal Uncle     No Known Problems Maternal Grandmother     No Known Problems Maternal Grandfather     No Known Problems Paternal Grandmother     No Known Problems Paternal Grandfather     No Known Problems Other     Amblyopia Neg Hx     Blindness Neg Hx     Cancer Neg Hx     Cataracts Neg Hx     Glaucoma Neg Hx     Macular degeneration Neg Hx     Retinal detachment Neg Hx     Strabismus Neg Hx     Stroke Neg Hx     Thyroid disease Neg Hx     Anemia Neg Hx     Arrhythmia Neg Hx     Asthma Neg Hx     Clotting disorder Neg Hx     Fainting Neg Hx     Heart attack Neg Hx     Heart disease Neg Hx     Heart failure Neg Hx     Hyperlipidemia Neg Hx     Atrial Septal Defect Neg Hx       Review of patient's allergies indicates:   Allergen Reactions    Flurox [fluorescein-benoxinate] Hives     Rash, Itching    Lyrica [pregabalin] Hallucinations    Morphine Other (See Comments)     Mental disturbances, delusional    Tetracyclines Nausea And Vomiting    Zofran [ondansetron hcl (pf)] Other (See Comments)     Muscle spasms    Gabapentin Nausea And Vomiting     has a current medication list which includes the following prescription(s): alprazolam, clonidine, clonidine 0.1  mg/24 hr td ptwk, ergocalciferol (vitamin d2), levothyroxine, oxycodone, pantoprazole, potassium chloride, promethazine hcl, thyroid (pork), aspirin, atorvastatin, dronabinol, duloxetine, insulin lispro, losartan, propranolol, and zolpidem.           Meds:     Review of patient's allergies indicates:   Allergen Reactions    Flurox [fluorescein-benoxinate] Hives     Rash, Itching    Lyrica [pregabalin] Hallucinations    Morphine Other (See Comments)     Mental disturbances, delusional    Tetracyclines Nausea And Vomiting    Zofran [ondansetron hcl (pf)] Other (See Comments)     Muscle spasms    Gabapentin Nausea And Vomiting       Current Outpatient Medications:     ALPRAZolam (XANAX) 2 MG Tab, Take 2 mg by mouth nightly as needed., Disp: , Rfl:     cloNIDine (CATAPRES) 0.1 MG tablet, Take 0.1 mg by mouth 2 (two) times daily., Disp: , Rfl:     cloNIDine 0.1 mg/24 hr td ptwk (CATAPRES) 0.1 mg/24 hr, Place 1 patch onto the skin every 7 days., Disp: , Rfl:     ergocalciferol, vitamin D2, (VITAMIN D ORAL), Take 1 tablet by mouth Daily., Disp: , Rfl:     levothyroxine (SYNTHROID) 75 MCG tablet, Take 75 mcg by mouth before breakfast., Disp: , Rfl:     oxycodone (OXY-IR) 5 mg Cap, Take 10 mg by mouth every 4 (four) hours as needed., Disp: , Rfl:     pantoprazole (PROTONIX) 40 MG tablet, Take 40 mg by mouth once daily., Disp: , Rfl:     potassium chloride (MICRO-K) 10 MEQ CpSR, Take 10 mEq by mouth once daily., Disp: , Rfl:     PROMETHAZINE HCL (PROMETHAZINE ORAL), Take 25 mg by mouth every 6 (six) hours as needed. 1-2 tablet, Disp: , Rfl:     thyroid, pork, (ARMOUR THYROID) 60 mg Tab, Take 90 mg by mouth before breakfast., Disp: , Rfl:     aspirin (ECOTRIN) 81 MG EC tablet, Take 325 mg by mouth once daily. , Disp: , Rfl:     atorvastatin (LIPITOR) 40 MG tablet, Take 40 mg by mouth once daily., Disp: , Rfl:     dronabinol (MARINOL) 2.5 MG capsule, Take 2.5 mg by mouth 2 (two) times daily before meals., Disp: , Rfl:      "DULoxetine (CYMBALTA) 30 MG capsule, Take 1 capsule (30 mg total) by mouth once daily. (Patient not taking: Reported on 3/13/2023), Disp: 30 capsule, Rfl: 11    insulin lispro 100 unit/mL pen, Inject into the skin., Disp: , Rfl:     losartan (COZAAR) 100 MG tablet, Take 100 mg by mouth once daily., Disp: , Rfl:     propranoloL 120 mg Cp24, Take by mouth., Disp: , Rfl:     zolpidem (AMBIEN) 10 mg Tab, Take 5 mg by mouth nightly as needed., Disp: , Rfl:     Objective:   /82 (BP Location: Left arm, Patient Position: Sitting, BP Method: Large (Automatic))   Pulse 86   Ht 5' 1.2" (1.554 m)   Wt 72.1 kg (158 lb 15.2 oz)   SpO2 96%   BMI 29.84 kg/m²   Physical Exam  Constitutional:       Appearance: Normal appearance.   HENT:      Head: Normocephalic and atraumatic.      Mouth/Throat:      Mouth: Mucous membranes are moist.   Eyes:      Extraocular Movements: Extraocular movements intact.   Cardiovascular:      Rate and Rhythm: Normal rate and regular rhythm.      Pulses:           Radial pulses are 2+ on the right side and 2+ on the left side.      Heart sounds: Normal heart sounds, S1 normal and S2 normal. No murmur heard.  Pulmonary:      Effort: Pulmonary effort is normal.      Breath sounds: Normal breath sounds. No wheezing, rhonchi or rales.   Abdominal:      General: Bowel sounds are normal. There is no distension.      Palpations: Abdomen is soft.      Tenderness: There is no abdominal tenderness.   Musculoskeletal:      Cervical back: Normal range of motion.      Right lower leg: No edema.      Left lower leg: No edema.   Skin:     General: Skin is warm and dry.   Neurological:      Mental Status: She is alert and oriented to person, place, and time.   Psychiatric:         Mood and Affect: Mood normal.         Behavior: Behavior normal.       Labs:     Lab Results   Component Value Date     04/11/2006    K 3.8 04/11/2006     04/11/2006    CO2 27 04/11/2006    BUN 7 04/11/2006    CREATININE " 0.8 2006     No results found for: HGBA1C  No results found for: BNP, BNPTRIAGEBLO    Lab Results   Component Value Date    WBC 9.11 2006    HGB 13.6 2006    HCT 40.3 2006     2006     Lab Results   Component Value Date    CHOL 260 (H) 2009    HDL 46 2009    LDLCALC 188.0 (H) 2009    TRIG 130 2009       Lab Results   Component Value Date     2006    K 3.8 2006     2006    CO2 27 2006    BUN 7 2006    CREATININE 0.8 2006     No results found for: HGBA1C  No results found for: BNP, BNPTRIAGEBLO Lab Results   Component Value Date    WBC 9.11 2006    HGB 13.6 2006    HCT 40.3 2006     2006     Lab Results   Component Value Date    CHOL 260 (H) 2009    HDL 46 2009    LDLCALC 188.0 (H) 2009    TRIG 130 2009                Cardiovascular Imaging:     [facility]:    - normal coronary arteries (in legacy)    Assessment:       1. Chest pain, unspecified type    2. Chest pain on exertion    3. Non-alcoholic cirrhosis    4. Primary hypertension             Plan:     Chest pain on exertion  Patient have atypical chest pain at rest and on exertion, is not reproducible.   Plan for pharm stress echo, will call patient with results    Non-alcoholic cirrhosis  Patient has had non alcoholic cirrhosis for a year  Has not been followed recently, has plans to arrange for follow up    Primary hypertension  Blood pressure well controlled  Continue current anti hypertensives        Signed:  Lexx Boyd M.D.  Interventional Cardiology Fellow PGY-8  [facility]   2023    Staff:  I have personally taken the history and examined this patient and agree with the fellow's note as stated above and amended it accordingly :-)  She  has LVH with strain on EKG and atypical chest pain.  Will do  stress echo to r/o ischemia.

## 2023-03-13 NOTE — ASSESSMENT & PLAN NOTE
Patient have atypical chest pain at rest and on exertion, is not reproducible.   Plan for pharm stress echo, will call patient with results

## 2023-03-23 ENCOUNTER — TELEPHONE (OUTPATIENT)
Dept: HEPATOLOGY | Facility: CLINIC | Age: 58
End: 2023-03-23
Payer: COMMERCIAL

## 2023-03-23 NOTE — TELEPHONE ENCOUNTER
----- Message from Reji Dawkins MA sent at 3/23/2023  9:37 AM CDT -----  Regarding: Schedule appt  Contact: [phone]  Patient is calling to find out if her appt with doctor can be on the same day as Stress test. Patient is coming from [place]. Please call and advise.

## 2023-03-31 ENCOUNTER — TELEPHONE (OUTPATIENT)
Dept: HEPATOLOGY | Facility: CLINIC | Age: 58
End: 2023-03-31
Payer: COMMERCIAL

## 2023-03-31 ENCOUNTER — TELEPHONE (OUTPATIENT)
Dept: TRANSPLANT | Facility: CLINIC | Age: 58
End: 2023-03-31
Payer: COMMERCIAL

## 2023-03-31 ENCOUNTER — PATIENT MESSAGE (OUTPATIENT)
Dept: HEPATOLOGY | Facility: CLINIC | Age: 58
End: 2023-03-31
Payer: COMMERCIAL

## 2023-03-31 NOTE — TELEPHONE ENCOUNTER
----- Message from Lana Sanford sent at 3/30/2023  3:24 PM CDT -----  Regarding: FW: Ext referral    ----- Message -----  From: Yarelis Doe  Sent: 3/30/2023   3:21 PM CDT  To: [facility] Liver Referral Pool  Subject: Ext referral                                     Good afternoon,    Current pt is being referred from Dr Dickson Fabian for fatty liver. I have scanned the referral and records in to media mgr. Please contact pt to schedule and let me know if I can help any further.    Thank you,  Yarelis Doe  [facility]  Ext [phone]

## 2023-03-31 NOTE — TELEPHONE ENCOUNTER
----- Message from Lana Sanford sent at 3/31/2023  3:12 PM CDT -----  Regarding: sending bec i see you spoke with her re appts same day.  riya had the referral sent to me yesterday. Referral entered.  insurance up to date  Pt records reviewed.  Pt will be referred to Hepatology due to fatty liver  Initial referral received  from  Darion Fabian  [phone] Fax [phone]  Referral letter sent to patient.      RECORDS SCANNED IN MEDIA UNDER HEPATOLOGY REFERRAL .

## 2023-03-31 NOTE — TELEPHONE ENCOUNTER
Pt records reviewed.  Pt will be referred to Hepatology due to fatty liver  Initial referral received  from  Darion Fabian  [phone] Fax [phone]  Referral letter sent to patient.      RECORDS SCANNED IN MEDIA UNDER HEPATOLOGY REFERRAL .

## 2023-04-03 ENCOUNTER — TELEPHONE (OUTPATIENT)
Dept: HEPATOLOGY | Facility: CLINIC | Age: 58
End: 2023-04-03
Payer: COMMERCIAL

## 2023-04-03 NOTE — TELEPHONE ENCOUNTER
----- Message from Lana Sanford sent at 3/31/2023  3:12 PM CDT -----  Regarding: sending bec i see you spoke with her re appts same day.  riya had the referral sent to me yesterday. Referral entered.  insurance up to date  Pt records reviewed.  Pt will be referred to Hepatology due to fatty liver  Initial referral received  from  Darion Fabian  [phone] Fax [phone]  Referral letter sent to patient.      RECORDS SCANNED IN MEDIA UNDER HEPATOLOGY REFERRAL .

## 2023-04-03 NOTE — TELEPHONE ENCOUNTER
Spoke w pt she stated she wants to see a MD will place a recall the next available in person is August 2023

## 2023-04-03 NOTE — TELEPHONE ENCOUNTER
----- Message from Lana Sanford sent at 3/31/2023  3:12 PM CDT -----  Regarding: sending bec i see you spoke with her re appts same day.  riya had the referral sent to me yesterday. Referral entered.  insurance up to date  Pt records reviewed.  Pt will be referred to Hepatology due to fatty liver  Initial referral received  from  Darion Fabian  [phone] Fax [phone]  Referral letter sent to patient.      RECORDS SCANNED IN MEDIA UNDER HEPATOLOGY REFERRAL .

## 2023-07-26 ENCOUNTER — TELEPHONE (OUTPATIENT)
Dept: HEPATOLOGY | Facility: CLINIC | Age: 58
End: 2023-07-26
Payer: COMMERCIAL

## 2023-07-26 NOTE — TELEPHONE ENCOUNTER
----- Message from Jonathan Bland sent at 7/26/2023 10:44 AM CDT -----  Contact: [phone]  Juany Bourne calling regarding Appointment Access  (message) Pt would like to schedule a appt to see Dr Martinez only try to schedule no appt available appt asking for a call back to help get schedule.

## 2023-08-02 NOTE — TELEPHONE ENCOUNTER
Patient called to schedule Hepatology appointment, left voicemail to call back at [phone]. Patient wants to be seen by Dr. Martinez lives in [place]. Patient cancelled appointment in July

## 2023-08-04 ENCOUNTER — TELEPHONE (OUTPATIENT)
Dept: HEPATOLOGY | Facility: CLINIC | Age: 58
End: 2023-08-04
Payer: COMMERCIAL

## 2023-08-04 NOTE — TELEPHONE ENCOUNTER
Called again the pt, to offer the 8/9 appt.  Patient declined the offer.  But, she ask to schedule here to the next available 10/19/2023 with Dr. Martinez.  Scheduled appointment.  patient confirmed and agreed with the schedule.  Reminder letter mailed.

## 2023-08-04 NOTE — TELEPHONE ENCOUNTER
----- Message from Julius Navarro MA sent at 8/4/2023 11:12 AM CDT -----  Regarding: FW: Missed call  Contact: Pt [phone]    ----- Message -----  From: Gabrielle Dominguez RN  Sent: 8/4/2023  11:05 AM CDT  To: Julius Navarro MA  Subject: RE: Missed call                                  Called patient and left her voicemail, Dr. Martinez has an opening on Wed 8/9/23 in the afternoon.    Thanks  ----- Message -----  From: Julius Navarro MA  Sent: 8/2/2023   4:06 PM CDT  To: Gabrielle Dominguez RN  Subject: FW: Missed call                                    ----- Message -----  From: Cherrie Clemons  Sent: 8/2/2023   3:44 PM CDT  To: Michelle Jenkins Staff  Subject: Missed call                                              Caller:  Juany Bourne       Returning call to:  Gabrielle       Caller can be reached at:  [phone]      Nature of the call:   Returning missed call in regard to scheduling appt

## 2023-08-04 NOTE — TELEPHONE ENCOUNTER
Scheduling attempt # 2:  Called the patient to schedule a hepatology consult from referral.  No answer, left voicemail with call back # [phone].

## 2023-08-04 NOTE — TELEPHONE ENCOUNTER
Returned call to the patient to schedule a hepatology consult from referral. Offered 8/9/2023, patient stated that schedule is not going to work, they have an schedule for that day. She just ask to put in the next available which is Oct. 19, 2023 @ 9:30 am.  Patient confirmed and agreed with the schedule.  Reminder letter mailed.

## 2023-10-19 ENCOUNTER — PROCEDURE VISIT (OUTPATIENT)
Dept: HEPATOLOGY | Facility: CLINIC | Age: 58
End: 2023-10-19
Payer: COMMERCIAL

## 2023-10-19 ENCOUNTER — LAB VISIT (OUTPATIENT)
Dept: LAB | Facility: HOSPITAL | Age: 58
End: 2023-10-19
Attending: INTERNAL MEDICINE
Payer: COMMERCIAL

## 2023-10-19 ENCOUNTER — OFFICE VISIT (OUTPATIENT)
Dept: HEPATOLOGY | Facility: CLINIC | Age: 58
End: 2023-10-19
Payer: COMMERCIAL

## 2023-10-19 ENCOUNTER — TELEPHONE (OUTPATIENT)
Dept: HEPATOLOGY | Facility: CLINIC | Age: 58
End: 2023-10-19
Payer: COMMERCIAL

## 2023-10-19 VITALS
WEIGHT: 160.25 LBS | HEART RATE: 106 BPM | OXYGEN SATURATION: 95 % | BODY MASS INDEX: 30.09 KG/M2 | DIASTOLIC BLOOD PRESSURE: 86 MMHG | SYSTOLIC BLOOD PRESSURE: 143 MMHG

## 2023-10-19 DIAGNOSIS — K76.0 NAFLD (NONALCOHOLIC FATTY LIVER DISEASE): ICD-10-CM

## 2023-10-19 DIAGNOSIS — K76.0 NAFLD (NONALCOHOLIC FATTY LIVER DISEASE): Primary | ICD-10-CM

## 2023-10-19 LAB
AFP SERPL-MCNC: 4.3 NG/ML (ref 0–8.4)
ALBUMIN SERPL BCP-MCNC: 4 G/DL (ref 3.5–5.2)
ALP SERPL-CCNC: 82 U/L (ref 55–135)
ALT SERPL W/O P-5'-P-CCNC: 34 U/L (ref 10–44)
ANION GAP SERPL CALC-SCNC: 14 MMOL/L (ref 8–16)
AST SERPL-CCNC: 31 U/L (ref 10–40)
BASOPHILS # BLD AUTO: 0.03 K/UL (ref 0–0.2)
BASOPHILS # BLD AUTO: 0.03 K/UL (ref 0–0.2)
BASOPHILS NFR BLD: 0.3 % (ref 0–1.9)
BASOPHILS NFR BLD: 0.3 % (ref 0–1.9)
BILIRUB SERPL-MCNC: 0.3 MG/DL (ref 0.1–1)
BUN SERPL-MCNC: 12 MG/DL (ref 6–20)
CALCIUM SERPL-MCNC: 9.8 MG/DL (ref 8.7–10.5)
CHLORIDE SERPL-SCNC: 104 MMOL/L (ref 95–110)
CO2 SERPL-SCNC: 23 MMOL/L (ref 23–29)
CREAT SERPL-MCNC: 1 MG/DL (ref 0.5–1.4)
DIFFERENTIAL METHOD: ABNORMAL
DIFFERENTIAL METHOD: ABNORMAL
EOSINOPHIL # BLD AUTO: 0 K/UL (ref 0–0.5)
EOSINOPHIL # BLD AUTO: 0 K/UL (ref 0–0.5)
EOSINOPHIL NFR BLD: 0.4 % (ref 0–8)
EOSINOPHIL NFR BLD: 0.4 % (ref 0–8)
ERYTHROCYTE [DISTWIDTH] IN BLOOD BY AUTOMATED COUNT: 14.2 % (ref 11.5–14.5)
ERYTHROCYTE [DISTWIDTH] IN BLOOD BY AUTOMATED COUNT: 14.2 % (ref 11.5–14.5)
EST. GFR  (NO RACE VARIABLE): >60 ML/MIN/1.73 M^2
FERRITIN SERPL-MCNC: 315 NG/ML (ref 20–300)
GLUCOSE SERPL-MCNC: 118 MG/DL (ref 70–110)
HBV SURFACE AG SERPL QL IA: NORMAL
HCT VFR BLD AUTO: 46.2 % (ref 37–48.5)
HCT VFR BLD AUTO: 46.2 % (ref 37–48.5)
HCV AB SERPL QL IA: NORMAL
HGB BLD-MCNC: 15.1 G/DL (ref 12–16)
HGB BLD-MCNC: 15.1 G/DL (ref 12–16)
IGA SERPL-MCNC: 125 MG/DL (ref 40–350)
IGG SERPL-MCNC: 1390 MG/DL (ref 650–1600)
IGM SERPL-MCNC: 130 MG/DL (ref 50–300)
IMM GRANULOCYTES # BLD AUTO: 0.03 K/UL (ref 0–0.04)
IMM GRANULOCYTES # BLD AUTO: 0.03 K/UL (ref 0–0.04)
IMM GRANULOCYTES NFR BLD AUTO: 0.3 % (ref 0–0.5)
IMM GRANULOCYTES NFR BLD AUTO: 0.3 % (ref 0–0.5)
INR PPP: 0.9 (ref 0.8–1.2)
LYMPHOCYTES # BLD AUTO: 1.5 K/UL (ref 1–4.8)
LYMPHOCYTES # BLD AUTO: 1.5 K/UL (ref 1–4.8)
LYMPHOCYTES NFR BLD: 15.4 % (ref 18–48)
LYMPHOCYTES NFR BLD: 15.4 % (ref 18–48)
MCH RBC QN AUTO: 29.8 PG (ref 27–31)
MCH RBC QN AUTO: 29.8 PG (ref 27–31)
MCHC RBC AUTO-ENTMCNC: 32.7 G/DL (ref 32–36)
MCHC RBC AUTO-ENTMCNC: 32.7 G/DL (ref 32–36)
MCV RBC AUTO: 91 FL (ref 82–98)
MCV RBC AUTO: 91 FL (ref 82–98)
MONOCYTES # BLD AUTO: 0.4 K/UL (ref 0.3–1)
MONOCYTES # BLD AUTO: 0.4 K/UL (ref 0.3–1)
MONOCYTES NFR BLD: 4.3 % (ref 4–15)
MONOCYTES NFR BLD: 4.3 % (ref 4–15)
NEUTROPHILS # BLD AUTO: 7.6 K/UL (ref 1.8–7.7)
NEUTROPHILS # BLD AUTO: 7.6 K/UL (ref 1.8–7.7)
NEUTROPHILS NFR BLD: 79.3 % (ref 38–73)
NEUTROPHILS NFR BLD: 79.3 % (ref 38–73)
NRBC BLD-RTO: 0 /100 WBC
NRBC BLD-RTO: 0 /100 WBC
PLATELET # BLD AUTO: 346 K/UL (ref 150–450)
PLATELET # BLD AUTO: 346 K/UL (ref 150–450)
PMV BLD AUTO: 8.8 FL (ref 9.2–12.9)
PMV BLD AUTO: 8.8 FL (ref 9.2–12.9)
POTASSIUM SERPL-SCNC: 4.1 MMOL/L (ref 3.5–5.1)
PROT SERPL-MCNC: 8.3 G/DL (ref 6–8.4)
PROTHROMBIN TIME: 9.7 SEC (ref 9–12.5)
RBC # BLD AUTO: 5.06 M/UL (ref 4–5.4)
RBC # BLD AUTO: 5.06 M/UL (ref 4–5.4)
SODIUM SERPL-SCNC: 141 MMOL/L (ref 136–145)
WBC # BLD AUTO: 9.53 K/UL (ref 3.9–12.7)
WBC # BLD AUTO: 9.53 K/UL (ref 3.9–12.7)

## 2023-10-19 PROCEDURE — 87340 HEPATITIS B SURFACE AG IA: CPT | Performed by: INTERNAL MEDICINE

## 2023-10-19 PROCEDURE — 82542 COL CHROMOTOGRAPHY QUAL/QUAN: CPT | Performed by: INTERNAL MEDICINE

## 2023-10-19 PROCEDURE — 91200 FIBROSCAN NEW ORLEANS (VIBRATION CONTROLLED TRANSIENT ELASTOGRAPHY): ICD-10-PCS | Mod: S$GLB,,, | Performed by: INTERNAL MEDICINE

## 2023-10-19 PROCEDURE — 3077F SYST BP >= 140 MM HG: CPT | Mod: CPTII,S$GLB,, | Performed by: INTERNAL MEDICINE

## 2023-10-19 PROCEDURE — 86235 NUCLEAR ANTIGEN ANTIBODY: CPT | Mod: 59 | Performed by: INTERNAL MEDICINE

## 2023-10-19 PROCEDURE — 99205 PR OFFICE/OUTPT VISIT, NEW, LEVL V, 60-74 MIN: ICD-10-PCS | Mod: S$GLB,,, | Performed by: INTERNAL MEDICINE

## 2023-10-19 PROCEDURE — 3008F BODY MASS INDEX DOCD: CPT | Mod: CPTII,S$GLB,, | Performed by: INTERNAL MEDICINE

## 2023-10-19 PROCEDURE — 86038 ANTINUCLEAR ANTIBODIES: CPT | Performed by: INTERNAL MEDICINE

## 2023-10-19 PROCEDURE — 3008F PR BODY MASS INDEX (BMI) DOCUMENTED: ICD-10-PCS | Mod: CPTII,S$GLB,, | Performed by: INTERNAL MEDICINE

## 2023-10-19 PROCEDURE — 80053 COMPREHEN METABOLIC PANEL: CPT | Performed by: INTERNAL MEDICINE

## 2023-10-19 PROCEDURE — 86039 ANTINUCLEAR ANTIBODIES (ANA): CPT | Performed by: INTERNAL MEDICINE

## 2023-10-19 PROCEDURE — 85025 COMPLETE CBC W/AUTO DIFF WBC: CPT | Performed by: INTERNAL MEDICINE

## 2023-10-19 PROCEDURE — 99999 PR PBB SHADOW E&M-EST. PATIENT-LVL IV: CPT | Mod: PBBFAC,,, | Performed by: INTERNAL MEDICINE

## 2023-10-19 PROCEDURE — 82105 ALPHA-FETOPROTEIN SERUM: CPT | Performed by: INTERNAL MEDICINE

## 2023-10-19 PROCEDURE — 82784 ASSAY IGA/IGD/IGG/IGM EACH: CPT | Mod: 59 | Performed by: INTERNAL MEDICINE

## 2023-10-19 PROCEDURE — 3079F DIAST BP 80-89 MM HG: CPT | Mod: CPTII,S$GLB,, | Performed by: INTERNAL MEDICINE

## 2023-10-19 PROCEDURE — 91200 LIVER ELASTOGRAPHY: CPT | Mod: S$GLB,,, | Performed by: INTERNAL MEDICINE

## 2023-10-19 PROCEDURE — 1159F MED LIST DOCD IN RCRD: CPT | Mod: CPTII,S$GLB,, | Performed by: INTERNAL MEDICINE

## 2023-10-19 PROCEDURE — 99999 PR PBB SHADOW E&M-EST. PATIENT-LVL IV: ICD-10-PCS | Mod: PBBFAC,,, | Performed by: INTERNAL MEDICINE

## 2023-10-19 PROCEDURE — 99205 OFFICE O/P NEW HI 60 MIN: CPT | Mod: S$GLB,,, | Performed by: INTERNAL MEDICINE

## 2023-10-19 PROCEDURE — 82728 ASSAY OF FERRITIN: CPT | Performed by: INTERNAL MEDICINE

## 2023-10-19 PROCEDURE — 3077F PR MOST RECENT SYSTOLIC BLOOD PRESSURE >= 140 MM HG: ICD-10-PCS | Mod: CPTII,S$GLB,, | Performed by: INTERNAL MEDICINE

## 2023-10-19 PROCEDURE — 85610 PROTHROMBIN TIME: CPT | Performed by: INTERNAL MEDICINE

## 2023-10-19 PROCEDURE — 86015 ACTIN ANTIBODY EACH: CPT | Performed by: INTERNAL MEDICINE

## 2023-10-19 PROCEDURE — 86803 HEPATITIS C AB TEST: CPT | Performed by: INTERNAL MEDICINE

## 2023-10-19 PROCEDURE — 3079F PR MOST RECENT DIASTOLIC BLOOD PRESSURE 80-89 MM HG: ICD-10-PCS | Mod: CPTII,S$GLB,, | Performed by: INTERNAL MEDICINE

## 2023-10-19 PROCEDURE — 1159F PR MEDICATION LIST DOCUMENTED IN MEDICAL RECORD: ICD-10-PCS | Mod: CPTII,S$GLB,, | Performed by: INTERNAL MEDICINE

## 2023-10-19 PROCEDURE — 86381 MITOCHONDRIAL ANTIBODY EACH: CPT | Performed by: INTERNAL MEDICINE

## 2023-10-19 NOTE — PROGRESS NOTES
P  Subjective:       Patient ID: Juany Bourne is a 57 y.o. female.    Chief Complaint: Fatty Liver    HPI  I saw this 57 y.o.lady who came to the liver clinic with her .    Hepatic steatosis with midly elevated LFTs  NAFLD 5 years ago    Distended abdo- no ascites- for multiple years  Occ edema    Daily vomiting until started on meds  Also diagnosed with Acute intermittent porphyria- [facility]    - recent coronary angio- ?normal accroding to patient      PMH:  DM  Hypertension  CAD  Thyroidectomy  Bilateral mastecomies or fibrocystic disease  Middle cerebral artery PTA in 2001  Hysterectomy    SH:  No alcohol  Vapes    FH:  Doesn't know FH- raised by state      Review of Systems   Constitutional:  Negative for activity change, appetite change, chills, fatigue, fever and unexpected weight change.   HENT:  Negative for ear pain, hearing loss, nosebleeds, sore throat and trouble swallowing.    Eyes:  Negative for redness and visual disturbance.   Respiratory:  Negative for cough, chest tightness, shortness of breath and wheezing.    Cardiovascular:  Negative for chest pain and palpitations.   Gastrointestinal:  Negative for abdominal distention, abdominal pain, blood in stool, constipation, diarrhea, nausea and vomiting.   Genitourinary:  Negative for difficulty urinating, dysuria, frequency, hematuria and urgency.   Musculoskeletal:  Negative for arthralgias, back pain, gait problem, joint swelling and myalgias.   Skin:  Negative for rash.   Neurological:  Negative for tremors, seizures, speech difficulty, weakness and headaches.   Hematological:  Negative for adenopathy.   Psychiatric/Behavioral:  Negative for confusion, decreased concentration and sleep disturbance. The patient is not nervous/anxious.          Lab Results   Component Value Date    ALT 34 10/19/2023    AST 31 10/19/2023    ALKPHOS 82 10/19/2023    BILITOT 0.3 10/19/2023     Past Medical History:   Diagnosis Date    Anxiety     Arthritis      Cancer     questionable breast cancer 1991    Cataract     Diabetes mellitus     insulin prn    Disc disease, degenerative, cervical     Encounter for blood transfusion     Hypertension     Stroke 2001    Tachycardia     Thyroid disease     thyroidectomy due to nodules     Past Surgical History:   Procedure Laterality Date    ANGIOPLASTY      cerebral    AUGMENTATION OF BREAST Bilateral     5 implant sx    BREAST SURGERY      implants bilateral    CATARACT EXTRACTION W/  INTRAOCULAR LENS IMPLANT Left 01/04/16    Dr Omer     HYSTERECTOMY      MASTECTOMY Bilateral     28 years ago    THYROIDECTOMY  2003     Current Outpatient Medications   Medication Sig    ALPRAZolam (XANAX) 2 MG Tab Take 2 mg by mouth nightly as needed.    aspirin (ECOTRIN) 81 MG EC tablet Take 325 mg by mouth once daily.     atorvastatin (LIPITOR) 40 MG tablet Take 40 mg by mouth once daily.    cloNIDine (CATAPRES) 0.1 MG tablet Take 0.1 mg by mouth 2 (two) times daily.    cloNIDine 0.1 mg/24 hr td ptwk (CATAPRES) 0.1 mg/24 hr Place 1 patch onto the skin every 7 days.    dronabinol (MARINOL) 2.5 MG capsule Take 2.5 mg by mouth 2 (two) times daily before meals.    ergocalciferol, vitamin D2, (VITAMIN D ORAL) Take 1 tablet by mouth Daily.    insulin lispro 100 unit/mL pen Inject into the skin.    levothyroxine (SYNTHROID) 75 MCG tablet Take 75 mcg by mouth before breakfast.    losartan (COZAAR) 100 MG tablet Take 100 mg by mouth once daily.    oxycodone (OXY-IR) 5 mg Cap Take 10 mg by mouth every 4 (four) hours as needed.    pantoprazole (PROTONIX) 40 MG tablet Take 40 mg by mouth once daily.    potassium chloride (MICRO-K) 10 MEQ CpSR Take 10 mEq by mouth once daily.    PROMETHAZINE HCL (PROMETHAZINE ORAL) Take 25 mg by mouth every 6 (six) hours as needed. 1-2 tablet    propranoloL 120 mg Cp24 Take by mouth.    thyroid, pork, (ARMOUR THYROID) 60 mg Tab Take 90 mg by mouth before breakfast.    zolpidem (AMBIEN) 10 mg Tab Take 5 mg by mouth nightly as  needed.    DULoxetine (CYMBALTA) 30 MG capsule Take 1 capsule (30 mg total) by mouth once daily. (Patient not taking: Reported on 3/13/2023)     No current facility-administered medications for this visit.       Objective:      Physical Exam  Constitutional:       General: She is not in acute distress.  HENT:      Head: Normocephalic.   Eyes:      Pupils: Pupils are equal, round, and reactive to light.   Neck:      Thyroid: No thyromegaly.      Vascular: No JVD.      Trachea: No tracheal deviation.   Cardiovascular:      Rate and Rhythm: Normal rate and regular rhythm.      Heart sounds: Normal heart sounds. No murmur heard.  Pulmonary:      Effort: Pulmonary effort is normal.      Breath sounds: Normal breath sounds. No stridor.   Abdominal:      General: There is distension.      Palpations: Abdomen is soft.      Comments: No signs of obvious ascites   Musculoskeletal:      Right lower leg: No edema.      Left lower leg: No edema.   Lymphadenopathy:      Head:      Right side of head: No submental, submandibular, tonsillar, preauricular, posterior auricular or occipital adenopathy.      Left side of head: No submental, submandibular, tonsillar, preauricular, posterior auricular or occipital adenopathy.      Cervical: No cervical adenopathy.   Neurological:      Mental Status: She is alert. She is not disoriented.      Cranial Nerves: No cranial nerve deficit.      Sensory: No sensory deficit.           Assessment:       1. NAFLD (nonalcoholic fatty liver disease)        Plan:   She certainly has a very distended abdomen but clinical examination was negative for ascites and she tells me that multiple imaging studies have failed to detect any fluid.    Fibroscan today showed stage 2 steastosis but no fibrosis.  I suspect her very distended abdomen is related to obesity and she does have metabolic risk factors for MASLD.      - Labs  - local CT scan  Clinic in Jan 2024.

## 2023-10-19 NOTE — PROCEDURES
FibroScan [place] (Vibration Controlled Transient Elastography)    Date/Time: 10/19/2023 1:45 PM    Performed by: Gregory Martinez MD  Authorized by: Gregory Martinez MD    Diagnosis:  NAFLD    Probe:  M    Universal Protocol: Patient's identity, procedure and site were verified, confirmatory pause was performed.  Discussed procedure including risks and potential complications.  Questions answered.  Patient verbalizes understanding and wishes to proceed with VCTE.     Procedure: After providing explanations of the procedure, patient was placed in the supine position with right arm in maximum abduction to allow optimal exposure of right lateral abdomen.  Patient was briefly assessed, Testing was performed in the mid-axillary location, 50Hz Shear Wave pulses were applied and the resulting Shear Wave and Propagation Speed detected with a 3.5 MHz ultrasonic signal, using the FibroScan probe, Skin to liver capsule distance and liver parenchyma were accessed during the entire examination with the FibroScan probe, Patient was instructed to breathe normally and to abstain from sudden movements during the procedure, allowing for random measurements of liver stiffness. At least 10 Shear Waves were produced, Individual measurements of each Shear Wave were calculated.  Patient tolerated the procedure well with no complications.  Meets discharge criteria as was dismissed.  Rates pain 0 out of 10.  Patient will follow up with ordering provider to review results.    Findings  Median liver stiffness score:  7.5  CAP Reading: dB/m:  317    IQR/med %:  12  Interpretation  Fibrosis interpretation is based on medial liver stiffness - Kilopascal (kPa).    Fibrosis Stage:  F 0-1  Steatosis interpretation is based on controlled attenuation parameter - (dB/m).    Steatosis Grade:  S2

## 2023-10-20 LAB
ANA PATTERN 1: NORMAL
ANA SER QL IF: POSITIVE
ANA TITR SER IF: NORMAL {TITER}
MITOCHONDRIA AB TITR SER IF: NORMAL {TITER}

## 2023-10-23 LAB
ANTI SM ANTIBODY: 0.1 RATIO (ref 0–0.99)
ANTI SM/RNP ANTIBODY: 0.11 RATIO (ref 0–0.99)
ANTI-SM INTERPRETATION: NEGATIVE
ANTI-SM/RNP INTERPRETATION: NEGATIVE
ANTI-SSA ANTIBODY: 0.08 RATIO (ref 0–0.99)
ANTI-SSA INTERPRETATION: NEGATIVE
ANTI-SSB ANTIBODY: 0.08 RATIO (ref 0–0.99)
ANTI-SSB INTERPRETATION: NEGATIVE
DSDNA AB SER-ACNC: NORMAL [IU]/ML

## 2023-10-24 LAB
A1AT PHENOTYP SERPL-IMP: NORMAL
A1AT SERPL NEPH-MCNC: 156 MG/DL (ref 100–190)
SMOOTH MUSCLE AB TITR SER IF: ABNORMAL {TITER}

## 2023-11-02 ENCOUNTER — TELEPHONE (OUTPATIENT)
Dept: HEPATOLOGY | Facility: CLINIC | Age: 58
End: 2023-11-02
Payer: COMMERCIAL

## 2023-11-02 NOTE — TELEPHONE ENCOUNTER
----- Message from Brittany Pierson sent at 11/2/2023 11:21 AM CDT -----  Contact: [phone] Patient  Pt is calling in regards to needing a copy of her recent lab work from 10/19/2023 faxed over to her PCP Dr Dickson Fabian please. Fax # [phone].  Call and advise. Thank you

## 2023-11-15 ENCOUNTER — TELEPHONE (OUTPATIENT)
Dept: HEPATOLOGY | Facility: CLINIC | Age: 58
End: 2023-11-15
Payer: COMMERCIAL

## 2023-11-15 NOTE — TELEPHONE ENCOUNTER
----- Message from Cherrie Clemons sent at 11/15/2023 11:05 AM CST -----  Regarding: Orders  Contact: Pt  [phone]          Name of Caller: Juany     Contact Preference: [phone]    Nature of Call:  Called to get orders for CT printed would like to pick them up on 11/16/23 will be in town

## 2023-12-12 ENCOUNTER — TELEPHONE (OUTPATIENT)
Dept: HEPATOLOGY | Facility: CLINIC | Age: 58
End: 2023-12-12
Payer: COMMERCIAL

## 2023-12-12 NOTE — TELEPHONE ENCOUNTER
----- Message from Veronica Nails sent at 12/12/2023 12:58 PM CST -----  Contact: [phone]  1MEDICALADVICE     Patient is calling for Medical Advice regarding:pt states the CT scan has not received the referral     How long has patient had these symptoms:    Pharmacy name and phone#:    Would like response via Boxevert:  no     Comments:  Pt is calling she states she has a CT scan and she states she has called several times for the referral in regards to this to be paid for she has an appt on 01/17 for the CT and she states she is in [place] and has to travel down here and she states the office has yet to get back to her in regards to the referral that is needing for her insurance to pay for this she is asking for a call back today she needs to have this done and she has called several times     Please call ASAP this is a timely matter it can be approved in 72 hours if marked Urgent

## 2023-12-12 NOTE — TELEPHONE ENCOUNTER
Spoke with patient regarding message below informed patient that I can send a message to Dr. Martinez regarding MRI also informed patient that the team who works on approval probably has not started working on MRI since it is scheduled in January. Informed patient that normally they will work on approval the week of and if anything is need to get it approved they will reach out to Dr. Martinez. Patient asked if we can keep updated or if someone can call her once approved. Informed patient that we are unable to give her or track the status. Patient verbalized understanding.

## 2023-12-18 ENCOUNTER — TELEPHONE (OUTPATIENT)
Dept: HEPATOLOGY | Facility: CLINIC | Age: 58
End: 2023-12-18
Payer: COMMERCIAL

## 2023-12-18 NOTE — TELEPHONE ENCOUNTER
----- Message from Gregory Martinez MD sent at 12/17/2023  5:20 PM CST -----  Contact: [phone]  I don't understand. I ordered the CT and will be done in Jan. It's approved.  Can you try and schedule it sooner?    What else does she need?      ----- Message -----  From: Sonia Mckenna MA  Sent: 12/12/2023   1:43 PM CST  To: Gregory Martinez MD    Good afternoon,     Please advise    Thanks  ----- Message -----  From: Veronica Nails  Sent: 12/12/2023   1:01 PM CST  To: Michelle Jenkins Staff    [email or web address]     Patient is calling for Medical Advice regarding:pt states the CT scan has not received the referral     How long has patient had these symptoms:    Pharmacy name and phone#:    Would like response via Akimbot:  no     Comments:  Pt is calling she states she has a CT scan and she states she has called several times for the referral in regards to this to be paid for she has an appt on 01/17 for the CT and she states she is in [place] and has to travel down here and she states the office has yet to get back to her in regards to the referral that is needing for her insurance to pay for this she is asking for a call back today she needs to have this done and she has called several times     Please call ASAP this is a timely matter it can be approved in 72 hours if marked Urgent